# Patient Record
Sex: FEMALE | URBAN - METROPOLITAN AREA
[De-identification: names, ages, dates, MRNs, and addresses within clinical notes are randomized per-mention and may not be internally consistent; named-entity substitution may affect disease eponyms.]

---

## 2021-11-23 ENCOUNTER — PATIENT OUTREACH (OUTPATIENT)
Dept: OTHER | Age: 36
End: 2021-11-23

## 2021-11-23 NOTE — PROGRESS NOTES
Patient on report as eligible for Case Management. Left discreet message on voicemail with this CM contact information. Will attempt to contact again to offer [address] [place] [address] Management services. Will attempt another outreach on 11/24. Patient was hospitalized under observation status at [facility] from 11/19 - 11/20 for vaginal bleeding at 23 weeks and 6 days gestation.

## 2021-11-24 ENCOUNTER — PATIENT OUTREACH (OUTPATIENT)
Dept: OTHER | Age: 36
End: 2021-11-24

## 2021-11-24 NOTE — PROGRESS NOTES
Patient identified as eligible for [address] [place] [address] services. Second telephone outreach attempted. Unable to leave discreet voicemail, as mailbox was full. Will send UTR letter. Will reach out again in about two weeks, 12/8.

## 2021-11-24 NOTE — LETTER
11/24/2021 8:33 AM    Ms. Villanueva [address] [address]        Dear Maye Rebollar,    My name is Celestine Anthony, Associate Care Manager for [facility] and I have been trying to reach you. The Associate Care Management (ACM) program is a free-of-charge confidential service provided to our associates and their family members covered by the [facility]. The program will provide an associate and his/her family with the [address] expertise to assist in navigating the health care delivery system, provider services, and their overall care needsso as to assure and improve health care interactions and enhance the quality of life. This program is designed to provide you with the opportunity to have a [employer] partner with you for the following services:     1) when you come home from the hospital or emergency room   2) when help is needed to manage your disease   3) when you need assistance coordinating services or appointments  4) when you need additional education, resources or assistance reaching your Be Well Health Program goals/requirements such as Be Well With Diabetes      [address] is dedicated to empowering the good health of its community and improving the quality of care and care experiences for associates and their families. We are committed to safeguarding patient confidentiality and privacy, assuring that every associate has the respect he or she deserves in managing their health. The information shared with your care manager will not be shared with anyone else aside from those you identify as part of your care team, and will only be used to assist you with any identified care needs. Please contact me if you would like this service provided to you. Sincerely,      Sarah Platt RN, [address] [place] [address] [address] [id number] [address] Nia Figueroa [phone]  [phone]  [email or web address]  [address] [facility] [address] email: [email or web address]

## 2021-11-26 ENCOUNTER — PATIENT OUTREACH (OUTPATIENT)
Dept: OTHER | Age: 36
End: 2021-11-26

## 2021-11-26 NOTE — PROGRESS NOTES
11/26/21 Assisting ACSabrina SIERRA RN with outreach/UTR letter. Letter generated, printed, and mailed.

## 2021-12-08 ENCOUNTER — PATIENT OUTREACH (OUTPATIENT)
Dept: OTHER | Age: 36
End: 2021-12-08

## 2021-12-08 NOTE — PROGRESS NOTES
Patient identified as eligible for [address] [place] [address] services. Third telephone outreach attempted. Unable to leave discreet voicemail with this CM confidential contact information, as patient's mailbox continues to be full. Sent UTR letter on 11/26/21 via mail. Will resolve as unable to reach if no response by 12/23.

## 2021-12-23 ENCOUNTER — PATIENT OUTREACH (OUTPATIENT)
Dept: OTHER | Age: 36
End: 2021-12-23

## 2021-12-23 NOTE — PROGRESS NOTES
Resolving current episode for case management due to patient unable to reach. Patient has not been reached after repeated calls and letters. Letter sent to patient notifying completion of services due to unable to reach. This writer's contact information and information regarding program services included in materials sent. Four telephonic attempts made to reach patient using the phone number provided by MMO. Unable to reach letter mailed on 11/24. Biopsy Method: double edge Personna blade